# Patient Record
Sex: MALE | Race: WHITE | URBAN - METROPOLITAN AREA
[De-identification: names, ages, dates, MRNs, and addresses within clinical notes are randomized per-mention and may not be internally consistent; named-entity substitution may affect disease eponyms.]

---

## 2017-01-02 ENCOUNTER — TRANSFERRED RECORDS (OUTPATIENT)
Dept: HEALTH INFORMATION MANAGEMENT | Facility: CLINIC | Age: 67
End: 2017-01-02

## 2017-01-04 ENCOUNTER — TRANSFERRED RECORDS (OUTPATIENT)
Dept: HEALTH INFORMATION MANAGEMENT | Facility: CLINIC | Age: 67
End: 2017-01-04

## 2017-01-09 ENCOUNTER — OFFICE VISIT (OUTPATIENT)
Dept: UROLOGY | Facility: CLINIC | Age: 67
End: 2017-01-09

## 2017-01-09 VITALS
WEIGHT: 280 LBS | HEIGHT: 72 IN | HEART RATE: 108 BPM | SYSTOLIC BLOOD PRESSURE: 131 MMHG | BODY MASS INDEX: 37.93 KG/M2 | DIASTOLIC BLOOD PRESSURE: 94 MMHG

## 2017-01-09 DIAGNOSIS — C68.0 MALIGNANT NEOPLASM OF URETHRA (H): Primary | ICD-10-CM

## 2017-01-09 DIAGNOSIS — C68.0 URETHRAL CANCER (H): Primary | ICD-10-CM

## 2017-01-09 ASSESSMENT — ENCOUNTER SYMPTOMS
NIGHT SWEATS: 0
MYALGIAS: 1
JOINT SWELLING: 0
ALTERED TEMPERATURE REGULATION: 0
DYSURIA: 0
POLYDIPSIA: 0
DECREASED APPETITE: 0
HEMATURIA: 0
WEIGHT GAIN: 0
FEVER: 0
CHILLS: 0
BACK PAIN: 0
HALLUCINATIONS: 0
INCREASED ENERGY: 0
MUSCLE CRAMPS: 1
ARTHRALGIAS: 0
STIFFNESS: 0
NECK PAIN: 0
MUSCLE WEAKNESS: 0
FATIGUE: 1
FLANK PAIN: 0
DIFFICULTY URINATING: 0
POLYPHAGIA: 0
WEIGHT LOSS: 0

## 2017-01-09 ASSESSMENT — PAIN SCALES - GENERAL: PAINLEVEL: NO PAIN (1)

## 2017-01-09 NOTE — PROGRESS NOTES
Chief Complaint:    High Grade Invasive Urethral Cancer           Consult or Referral:     Mr. Micah Lopez is a 66 year old male seen in consultation from Dr. Dorsey.         History of Present Illness:    Micah Lopez is a very pleasant 66 year old male who presents with a history of urethral cancer.  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  This was all recently diagnosed after noting some urinary frequency, burning with urination and and persistent symptoms after being treated for several months on antibioitics.  He eventually underwent a cystoscopy and was noted to have some necrotic material in the bulbar urethra.  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 Urothelial cancer with squamous differentiation.  He was counseled that he would likely need to have a cystoprostatectomy, penectomy and urethrectomy and possible need for complex plastics reconstruction, with a muscle flap.  He was referred for discussion of this.    He also noted that he experiences pain when he sits, though it is somewhat better after placement of the SPT.  He is a solo practicing  and will have to make arrangements for being away.    Also interested to know the utility of radiation or chemotherapy as neoajuvant or adjuvant treatments.        Date of Initial Diagnosis: 2016  Stage of Initial Diagnosis: [id number]  Grade at Initial Diagnosis: high  Site of First Diagnosis: bulbar urethra  Any Recurrence? n/a  Date of Last Cysto: 16  Date of Last Upper Tract Imagin2017    Bone scan, CT and MRI all suggest no distant mets.      Remote history of one urethral dilation.  No history of HPV, urethritis.  Maybe a history of prostatitis in the past.  Remote smoking history.           Past Medical History:   History reviewed. No pertinent past medical  history.  CHF  DM  hypercholesterolemia  Keratosis  Sleep apnea                   Past Surgical History:   History reviewed. No pertinent past surgical history.  Urethral Dilation  Cysto and SPT placement             Medications     No current outpatient prescriptions on file.     No current facility-administered medications for this visit.            Family History:   History reviewed. No pertinent family history.         Social History:     Social History     Social History     Marital Status:      Spouse Name: N/A     Number of Children: N/A     Years of Education: N/A     Occupational History     Not on file.     Social History Main Topics     Smoking status: Former Smoker     Smokeless tobacco: Never Used     Alcohol Use: Not on file     Drug Use: Not on file     Sexual Activity: Not on file     Other Topics Concern     Not on file     Social History Narrative     No narrative on file            Allergies:   Review of patient's allergies indicates no known allergies.         Review of Systems:  From intake questionnaire     Answers for HPI/ROS submitted by the patient on 1/9/2017   General Symptoms: Yes  Skin Symptoms: No  HENT Symptoms: No  EYE SYMPTOMS: No  HEART SYMPTOMS: No  LUNG SYMPTOMS: No  INTESTINAL SYMPTOMS: No  URINARY SYMPTOMS: Yes  REPRODUCTIVE SYMPTOMS: Yes  SKELETAL SYMPTOMS: Yes  BLOOD SYMPTOMS: No  NERVOUS SYSTEM SYMPTOMS: No  MENTAL HEALTH SYMPTOMS: No  Fever: No  Loss of appetite: No  Weight loss: No  Weight gain: No  Fatigue: Yes  Night sweats: No  Chills: No  Increased stress: No  Excessive hunger: No  Excessive thirst: No  Feeling hot or cold when others believe the temperature is normal: No  Loss of height: No  Post-operative complications: No  Surgical site pain: No  Hallucinations: No  Change in or Loss of Energy: No  Hyperactivity: No  Confusion: No  Trouble holding urine or incontinence: No  Pain or burning: No  Trouble starting or stopping: No  Increased frequency of  urination: No  Blood in urine: No  Decreased frequency of urination: No  Frequent nighttime urination: No  Flank pain: No  Difficulty emptying bladder: No  Back pain: No  Muscle aches: Yes  Neck pain: No  Swollen joints: No  Joint pain: No  Bone pain: No  Muscle cramps: Yes  Muscle weakness: No  Joint stiffness: No  Bone fracture: No  Scrotal pain or swelling: No  Erectile dysfunction: Yes  Penile discharge: Yes  Genital ulcers: No  Reduced libido: Yes             Physical Exam:     Patient is a 66 year old  male   Vitals: Blood pressure 131/94, pulse 108, height 1.829 m (6'), weight 127.007 kg (280 lb).  General Appearance Adult: Alert, no acute distress, oriented  HENT: throat/mouth:normal, good dentition  Lungs: no respiratory distress, or pursed lip breathing  Heart: No obvious jugular venous distension present  Abdomen: soft, nontender, no organomegaly or masses, Body mass index is 37.97 kg/(m^2)., scars noted None  Lymphatics: No cervical or supraclavicular adenopathy  Musculoskeltal: extremities normal, no peripheral edema  Skin: no suspicious lesions or rashes  Neuro: Alert, oriented, speech and mentation normal  Psych: affect and mood expectably down, but resolved to move forward  Gait: Normal        Labs and Pathology:    I reviewed all applicable laboratory and pathology data and went over findings with patient  Significant for No results found for this basename: cr    Invasive urethral cancer diagnosed on 12/21/2017        Imaging:    I reviewed all applicable imaging and went over findings with patient.  Significant for MRI, CT cap, bone scan negative      Outside and Past Medical records:    I spent 10 minutes reviewing outside and past medical records.             Assessment and Plan:     Assessment:   T3 urethral Cancer by imaging   Metastatic workup is completed.  If completed it does show sign of distant disease.    Plan:    We had a long discussion about invasive urethral cancer.  It is a rare  cancer and that makes predicting survival outcomes particularly challenging for his stage 3 urethral cancer (due to invasion of both corpora cavernosa).  Survival is poor without treatment with more than 50% dying within 1-2 years per the SEER medicare results RENE Griffin. (2011). Prognostic factors in male urethral cancer. Cancer, 117(11), 9681-5244. Https://doi.org/10.1002/cncr.02148    With radical surgical excision, there is a better survival rate with about 50-60% still alive with 10 years of follow up.    We discussed radical cystectomy and the potential complications associated with it, including a 2-3% perioperative mortality risk, and the risk of complications is about 60%.  We also discussed about 20-30% of patients need to go to a rehab facility after surgery and about 25% return to the hospital for complications.      We also briefly discussed the use of radiation combined with chemotherapy, and though this type of treatment is used, it is less effective and generally reserved for those who are not surgical candidates or those who refuse surgery all together.  However I would like to discuss in  oncology conf.  We also discussed the various types of urinary diversion including ileal conduit, [place] Pouch and ileal neobladder.  Neobladders are not used when the penis and urethra have to be removed in their entirety.  The various pros and cons of each type of urinary diversion was discussed and all questions were answered.  Prospective quality of life data is lacking that compares the various diversion types, but retrospective data suggest the differences are small by diversion type.    We discussed the use of neoadjuvant chemotherapy in the setting of muscle invasive bladder cancer and that the combine analyses of these studies indicate approximately a 5% absolute survival advantage at 5 years. We don't have any solid evidence of the utility of chemotherapy prior to urethral cancer resection, but it is  likely not very helpful in most cases, particularly with squamous cell cancer.  We also discussed the role of adjuvant chemotherapy.    The patient and his family had many questions and we went over these carefully.  We provided the patient with a bladder cancer binder and indicated the resources available inside.    The patient has decided to proceed with a radical cystoprostatectomy, penectomy, urethrectomy and lymph node dissection.  he would like to think about  Which type of urinary diversion.  Will get this scheduled in the near future.    Will need to see PAC, get US for DVT, see stoma therapy nurses, meet with plastics and we need to coordinate surgery schedules.    In the mean time, I encouraged physical fitness and activity.    We shared the bladder cancer binder and made him aware of the patient support group at the [facility].    Will try and connect with other urethral cancer patients so he can converse with them.      Orders  Orders Placed This Encounter   Procedures     Tawnya-Operative Worksheet     US Lower Extremity Venous Duplex Bilateral     WOC Nursing Referral     Nutrition Referral       F/U:   For surgery    I spent over 60 minutes with patient with more than 50% face to face discussion and coordination of care.        Ricky Brannon MD  Department of Urology Staff  [facility]    CC  Patient Care Team:  Vicente Jimenez MD as PCP - General (Internal Medicine)  Ricky Brannon MD as MD (Urology)  Carolina Fox as Referring Physician (Pediatrics)  CAROLINA FOX    Copy to patient  SUSANNA WAKEFIELD  [address] [place] [address]

## 2017-01-09 NOTE — NURSING NOTE
Chief Complaint   Patient presents with     Consult     New patient consult for urethral cancer       Initial Ht 1.829 m (6')  Wt 127.007 kg (280 lb)  BMI 37.97 kg/m2 Estimated body mass index is 37.97 kg/(m^2) as calculated from the following:    Height as of this encounter: 1.829 m (6').    Weight as of this encounter: 127.007 kg (280 lb).  BP completed using cuff size: AYAN HernandezA

## 2017-01-09 NOTE — PATIENT INSTRUCTIONS
Schedule surgery with Dr. Brannon.    It was a pleasure meeting with you today.  Thank you for allowing me and my team the privilege of caring for you today.  YOU are the reason we are here, and I truly hope we provided you with the excellent service you deserve.  Please let us know if there is anything else we can do for you so that we can be sure you are leaving completely satisfied with your care experience.      ARMINDA Douglas

## 2017-01-09 NOTE — MR AVS SNAPSHOT
After Visit Summary   1/9/2017    Micah Lopez    MRN: [id number]           Patient Information     Date Of Birth          1950        Visit Information        Provider Department      1/9/2017 7:30 AM Ricky Brannon MD [facility] Urology and [facility] for Prostate and Urologic Cancers        Today's Diagnoses     Malignant neoplasm of urethra (H)    -  1       Care Instructions    Schedule surgery with Dr. Brannon.    It was a pleasure meeting with you today.  Thank you for allowing me and my team the privilege of caring for you today.  YOU are the reason we are here, and I truly hope we provided you with the excellent service you deserve.  Please let us know if there is anything else we can do for you so that we can be sure you are leaving completely satisfied with your care experience.      ARMINDA Douglas        Follow-ups after your visit        Who to contact     Please call your clinic at [phone] to:    Ask questions about your health    Make or cancel appointments    Discuss your medicines    Learn about your test results    Speak to your doctor   If you have compliments or concerns about an experience at your clinic, or if you wish to file a complaint, please contact [facility] Patient Relations at [phone] or email us at [email or web address]         Additional Information About Your Visit        MyChart Information     [employer] is an electronic gateway that provides easy, online access to your medical records. With [employer], you can request a clinic appointment, read your test results, renew a prescription or communicate with your care team.     To sign up for [employer] visit the website at [email or web address]   You will be asked to enter the access code listed below, as well as some personal information. Please follow the directions to create your username and password.     Your access code is: [email or web address]  Expires:  4/3/2017  9:40 AM     Your access code will  in 90 days. If you need help or a new code, please contact your [facility] or call [phone] for assistance.        Care EveryWhere ID     This is your Care EveryWhere ID. This could be used by other organizations to access your [place] medical records  [id number]        Your Vitals Were     Pulse Height BMI (Body Mass Index)             108 1.829 m (6') 37.97 kg/m2          Blood Pressure from Last 3 Encounters:   17 131/94    Weight from Last 3 Encounters:   17 127.007 kg (280 lb)              We Performed the Following     Tawnya-Operative Worksheet        Primary Care Provider Office Phone # Fax #    Vicente Jimenez [phone]       [facility] MEDICAL [address]  [facility] [address]        Thank you!     Thank you for choosing [facility] UROLOGY AND [facility] FOR PROSTATE AND UROLOGIC CANCERS  for your care. Our goal is always to provide you with excellent care. Hearing back from our patients is one way we can continue to improve our services. Please take a few minutes to complete the written survey that you may receive in the mail after your visit with us. Thank you!             Your Updated Medication List - Protect others around you: Learn how to safely use, store and throw away your medicines at www.disposemymeds.org.      Notice  As of 2017  8:59 AM    You have not been prescribed any medications.

## 2017-01-09 NOTE — Clinical Note
1/9/2017       RE: Micah Lopez  [address]  [place] [address]     Dear Colleague,    Thank you for referring your patient, Micah Lopez, to the [facility] UROLOGY AND [facility] FOR PROSTATE AND UROLOGIC CANCERS at [facility]. Please see a copy of my visit note below.          Chief Complaint:    High Grade Invasive Urethral Cancer           Consult or Referral:     Mr. Micah Lopez is a 66 year old male seen in consultation from Dr. Dorsey.         History of Present Illness:    Micah Lopez is a very pleasant 66 year old male who presents with a history of urethral cancer.  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  This was all recently diagnosed after noting some urinary frequency, burning with urination and and persistent symptoms after being treated for several months on antibioitics.  He eventually underwent a cystoscopy and was noted to have some necrotic material in the bulbar urethra.  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 Urothelial cancer with squamous differentiation.  He was counseled that he would likely need to have a cystoprostatectomy, penectomy and urethrectomy and possible need for complex plastics reconstruction, with a muscle flap.  He was referred for discussion of this.    He also noted that he experiences pain when he sits, though it is somewhat better after placement of the SPT.  He is a solo practicing  and will have to make arrangements for being away.    Also interested to know the utility of radiation or chemotherapy as neoajuvant or adjuvant treatments.        Date of Initial Diagnosis: 12/21/2016  Stage of Initial Diagnosis: [id number]  Grade at Initial Diagnosis: high  Site of First Diagnosis: bulbar urethra  Any Recurrence? n/a  Date of Last Cysto: 12/21/16  Date of Last Upper Tract Imaging:  1/4/2017    Bone scan, CT and MRI all suggest no distant mets.      Remote history of one urethral dilation.  No history of HPV, urethritis.  Maybe a history of prostatitis in the past.  Remote smoking history.           Past Medical History:   History reviewed. No pertinent past medical history.  CHF  DM  hypercholesterolemia  Keratosis  Sleep apnea                   Past Surgical History:   History reviewed. No pertinent past surgical history.  Urethral Dilation  Cysto and SPT placement             Medications     No current outpatient prescriptions on file.     No current facility-administered medications for this visit.            Family History:   History reviewed. No pertinent family history.         Social History:     Social History     Social History     Marital Status:      Spouse Name: N/A     Number of Children: N/A     Years of Education: N/A     Occupational History     Not on file.     Social History Main Topics     Smoking status: Former Smoker     Smokeless tobacco: Never Used     Alcohol Use: Not on file     Drug Use: Not on file     Sexual Activity: Not on file     Other Topics Concern     Not on file     Social History Narrative     No narrative on file            Allergies:   Review of patient's allergies indicates no known allergies.         Review of Systems:  From intake questionnaire     Answers for HPI/ROS submitted by the patient on 1/9/2017   General Symptoms: Yes  Skin Symptoms: No  HENT Symptoms: No  EYE SYMPTOMS: No  HEART SYMPTOMS: No  LUNG SYMPTOMS: No  INTESTINAL SYMPTOMS: No  URINARY SYMPTOMS: Yes  REPRODUCTIVE SYMPTOMS: Yes  SKELETAL SYMPTOMS: Yes  BLOOD SYMPTOMS: No  NERVOUS SYSTEM SYMPTOMS: No  MENTAL HEALTH SYMPTOMS: No  Fever: No  Loss of appetite: No  Weight loss: No  Weight gain: No  Fatigue: Yes  Night sweats: No  Chills: No  Increased stress: No  Excessive hunger: No  Excessive thirst: No  Feeling hot or cold when others believe the temperature is normal: No  Loss  of height: No  Post-operative complications: No  Surgical site pain: No  Hallucinations: No  Change in or Loss of Energy: No  Hyperactivity: No  Confusion: No  Trouble holding urine or incontinence: No  Pain or burning: No  Trouble starting or stopping: No  Increased frequency of urination: No  Blood in urine: No  Decreased frequency of urination: No  Frequent nighttime urination: No  Flank pain: No  Difficulty emptying bladder: No  Back pain: No  Muscle aches: Yes  Neck pain: No  Swollen joints: No  Joint pain: No  Bone pain: No  Muscle cramps: Yes  Muscle weakness: No  Joint stiffness: No  Bone fracture: No  Scrotal pain or swelling: No  Erectile dysfunction: Yes  Penile discharge: Yes  Genital ulcers: No  Reduced libido: Yes             Physical Exam:     Patient is a 66 year old  male   Vitals: Blood pressure 131/94, pulse 108, height 1.829 m (6'), weight 127.007 kg (280 lb).  General Appearance Adult: Alert, no acute distress, oriented  HENT: throat/mouth:normal, good dentition  Lungs: no respiratory distress, or pursed lip breathing  Heart: No obvious jugular venous distension present  Abdomen: soft, nontender, no organomegaly or masses, Body mass index is 37.97 kg/(m^2)., scars noted None  Lymphatics: No cervical or supraclavicular adenopathy  Musculoskeltal: extremities normal, no peripheral edema  Skin: no suspicious lesions or rashes  Neuro: Alert, oriented, speech and mentation normal  Psych: affect and mood expectably down, but resolved to move forward  Gait: Normal        Labs and Pathology:    I reviewed all applicable laboratory and pathology data and went over findings with patient  Significant for No results found for this basename: cr    Invasive urethral cancer diagnosed on 12/21/2017        Imaging:    I reviewed all applicable imaging and went over findings with patient.  Significant for MRI, CT cap, bone scan negative      Outside and Past Medical records:    I spent 10 minutes reviewing  outside and past medical records.             Assessment and Plan:     Assessment:   T3 urethral Cancer by imaging   Metastatic workup is completed.  If completed it does show sign of distant disease.    Plan:    We had a long discussion about invasive urethral cancer.  It is a rare cancer and that makes predicting survival outcomes particularly challenging for his stage 3 urethral cancer (due to invasion of both corpora cavernosa).  Survival is poor without treatment with more than 50% dying within 1-2 years per the SEER medicare results LEN Griffin (2011). Prognostic factors in male urethral cancer. Cancer, 117(11), 8361-1369. Https://doi.org/10.1002/cncr.03167    With radical surgical excision, there is a better survival rate with about 50-60% still alive with 10 years of follow up.    We discussed radical cystectomy and the potential complications associated with it, including a 2-3% perioperative mortality risk, and the risk of complications is about 60%.  We also discussed about 20-30% of patients need to go to a rehab facility after surgery and about 25% return to the hospital for complications.      We also briefly discussed the use of radiation combined with chemotherapy, and though this type of treatment is used, it is less effective and generally reserved for those who are not surgical candidates or those who refuse surgery all together.  However I would like to discuss in  oncology conf.  We also discussed the various types of urinary diversion including ileal conduit, [place] Pouch and ileal neobladder.  Neobladders are not used when the penis and urethra have to be removed in their entirety.  The various pros and cons of each type of urinary diversion was discussed and all questions were answered.  Prospective quality of life data is lacking that compares the various diversion types, but retrospective data suggest the differences are small by diversion type.    We discussed the use of neoadjuvant  chemotherapy in the setting of muscle invasive bladder cancer and that the combine analyses of these studies indicate approximately a 5% absolute survival advantage at 5 years. We don't have any solid evidence of the utility of chemotherapy prior to urethral cancer resection, but it is likely not very helpful in most cases, particularly with squamous cell cancer.  We also discussed the role of adjuvant chemotherapy.    The patient and his family had many questions and we went over these carefully.  We provided the patient with a bladder cancer binder and indicated the resources available inside.    The patient has decided to proceed with a radical cystoprostatectomy, penectomy, urethrectomy and lymph node dissection.  he would like to think about  Which type of urinary diversion.  Will get this scheduled in the near future.    Will need to see PAC, get US for DVT, see stoma therapy nurses, meet with plastics and we need to coordinate surgery schedules.    In the mean time, I encouraged physical fitness and activity.    We shared the bladder cancer binder and made him aware of the patient support group at the [facility].    Will try and connect with other urethral cancer patients so he can converse with them.      Orders  Orders Placed This Encounter   Procedures     Tawnya-Operative Worksheet     US Lower Extremity Venous Duplex Bilateral     WOC Nursing Referral     Nutrition Referral       F/U:   For surgery    I spent over 60 minutes with patient with more than 50% face to face discussion and coordination of care.        Ricky Brannon MD  Department of Urology Staff  [facility]    CC  Patient Care Team:  Vicente Jimenez MD as PCP - General (Internal Medicine)  Rciky Brannon MD as MD (Urology)  Carolina Fox as Referring Physician (Pediatrics)  CAROLINA FOX    Copy to patient  SUSANNA WAKEFIELD  [address] [place] [address]

## 2017-01-09 NOTE — NURSING NOTE
Met with patient today in regards to Bladder cancer.  Introduced myself as patient's  Care Coordinator and  for Dr. Brannon.  Bladder cancer binder given to patient and contents gone over.  All questions and concerns were answered.  Business card with my information given to patient.   Encouraged to call back with any questions or concerns.  Patient states understanding.    Ginna Cosby RN         Pre Op Teaching Flowsheet       Pre and Post op Patient Education  Relevant Diagnosis:  Invasive urethral cancer  Surgical procedure:  Radical Cystoprostatectomy, penectomy and urethrectomy and ileal conduit or [place] pouch, lymph node dissection and complex wound closure  Teaching Topic:  Pre and post op teaching  Person Involved in teaching: Micah Lopez    Motivation Level:  Asks Questions: Yes  Eager to Learn:  Yes  Cooperative: Yes  Receptive (willing/able to accept information):  Yes    Patient demonstrates understanding of the following:  Date of surgery:  TBD  Location of surgery:  [address] [place] [address]  History and Physical and any other testing necessary prior to surgery: Yes  Required time line for completion of History and Physical and any pre-op testing: Yes    Patient demonstrates understanding of the following:  Pre-op bowel prep:  Clear liquids day prior with Fleets enema that night  Pre-op showering/scrub information with PCMX Soap: Yes  Blood thinner medications discussed and when to stop (if applicable):  Yes      Infection Prevention:   Patient demonstrates understanding of the following:  Surgical procedure site care taught: at time of discharge  Signs and symptoms of infection taught:  Yes      Post-op follow-up:  Discussed how to contact the hospital, nurse, and clinic scheduling staff if necessary.    Instructional materials used/given/mailed:  [facility] Booklet, post op teaching sheet, Map, Soap, and arrival/location information.    Surgical instructions packet given to  patient in office:  Yes.

## 2017-01-18 ENCOUNTER — OFFICE VISIT (OUTPATIENT)
Dept: SURGERY | Facility: CLINIC | Age: 67
End: 2017-01-18

## 2017-01-18 ENCOUNTER — OFFICE VISIT (OUTPATIENT)
Dept: WOUND CARE | Facility: CLINIC | Age: 67
End: 2017-01-18

## 2017-01-18 ENCOUNTER — ALLIED HEALTH/NURSE VISIT (OUTPATIENT)
Dept: SURGERY | Facility: CLINIC | Age: 67
End: 2017-01-18

## 2017-01-18 ENCOUNTER — OFFICE VISIT (OUTPATIENT)
Dept: PLASTIC SURGERY | Facility: CLINIC | Age: 67
End: 2017-01-18

## 2017-01-18 ENCOUNTER — ANESTHESIA EVENT (OUTPATIENT)
Dept: SURGERY | Facility: CLINIC | Age: 67
End: 2017-01-18

## 2017-01-18 VITALS
WEIGHT: 280 LBS | HEIGHT: 72 IN | DIASTOLIC BLOOD PRESSURE: 79 MMHG | TEMPERATURE: 97.9 F | OXYGEN SATURATION: 95 % | RESPIRATION RATE: 16 BRPM | SYSTOLIC BLOOD PRESSURE: 119 MMHG | BODY MASS INDEX: 37.93 KG/M2 | HEART RATE: 87 BPM

## 2017-01-18 DIAGNOSIS — Z01.818 PRE-OP EXAM: Primary | ICD-10-CM

## 2017-01-18 DIAGNOSIS — C68.0 URETHRAL CANCER (H): ICD-10-CM

## 2017-01-18 DIAGNOSIS — Z71.89 OSTOMY NURSE CONSULTATION: Primary | ICD-10-CM

## 2017-01-18 DIAGNOSIS — G47.30 SLEEP APNEA, UNSPECIFIED TYPE: ICD-10-CM

## 2017-01-18 DIAGNOSIS — C68.0 URETHRAL CANCER (H): Primary | ICD-10-CM

## 2017-01-18 DIAGNOSIS — Z71.89 ENCOUNTER FOR COUNSELING FOR UROSTOMY MANAGEMENT: ICD-10-CM

## 2017-01-18 DIAGNOSIS — E11.8 TYPE 2 DIABETES MELLITUS WITH COMPLICATION, WITHOUT LONG-TERM CURRENT USE OF INSULIN (H): ICD-10-CM

## 2017-01-18 LAB
ABO + RH BLD: NORMAL
ABO + RH BLD: NORMAL
ALBUMIN UR-MCNC: 30 MG/DL
APPEARANCE UR: ABNORMAL
BACTERIA #/AREA URNS HPF: ABNORMAL /HPF
BILIRUB UR QL STRIP: NEGATIVE
BLD GP AB SCN SERPL QL: NORMAL
BLOOD BANK CMNT PATIENT-IMP: NORMAL
BLOOD BANK CMNT PATIENT-IMP: NORMAL
COLOR UR AUTO: YELLOW
GLUCOSE UR STRIP-MCNC: 50 MG/DL
HBA1C MFR BLD: 8.6 % (ref 4.3–6)
HGB UR QL STRIP: ABNORMAL
HYALINE CASTS #/AREA URNS LPF: 8 /LPF (ref 0–2)
KETONES UR STRIP-MCNC: 5 MG/DL
LEUKOCYTE ESTERASE UR QL STRIP: ABNORMAL
MUCOUS THREADS #/AREA URNS LPF: PRESENT /LPF
NITRATE UR QL: POSITIVE
PH UR STRIP: 5 PH (ref 5–7)
RBC #/AREA URNS AUTO: 50 /HPF (ref 0–2)
SP GR UR STRIP: 1.02 (ref 1–1.03)
SPECIMEN EXP DATE BLD: NORMAL
SQUAMOUS #/AREA URNS AUTO: 1 /HPF (ref 0–1)
URN SPEC COLLECT METH UR: ABNORMAL
UROBILINOGEN UR STRIP-MCNC: 0 MG/DL (ref 0–2)
WBC #/AREA URNS AUTO: 128 /HPF (ref 0–2)

## 2017-01-18 RX ORDER — ACETAMINOPHEN 500 MG
500 TABLET ORAL PRN
COMMUNITY

## 2017-01-18 RX ORDER — CEFAZOLIN SODIUM 1 G/50ML
3 SOLUTION INTRAVENOUS
Status: CANCELLED | OUTPATIENT
Start: 2017-01-18

## 2017-01-18 RX ORDER — LISINOPRIL 10 MG/1
10 TABLET ORAL EVERY MORNING
COMMUNITY
Start: 2016-10-10

## 2017-01-18 RX ORDER — CITALOPRAM HYDROBROMIDE 20 MG/1
20 TABLET ORAL AT BEDTIME
COMMUNITY
Start: 2017-01-13

## 2017-01-18 RX ORDER — ATORVASTATIN CALCIUM 80 MG/1
80 TABLET, FILM COATED ORAL EVERY MORNING
COMMUNITY
Start: 2016-10-10

## 2017-01-18 RX ORDER — GLIPIZIDE AND METFORMIN HCL 2.5; 5 MG/1; MG/1
1 TABLET, FILM COATED ORAL
COMMUNITY

## 2017-01-18 RX ORDER — CYCLOBENZAPRINE HCL 10 MG
10 TABLET ORAL AT BEDTIME
COMMUNITY
Start: 2017-01-06

## 2017-01-18 NOTE — PATIENT INSTRUCTIONS
Preparing for Your Surgery      Name:  Micah Lopez   MRN:  [id number]   :  1950   Today's Date:  2017     Arriving for surgery:  Surgery date:  Will call with information  Surgery time:  Will call with information  Arrival time:  Will call with information  Please come to:     Will call with information    What can I eat or drink?  -  You may have solid food or milk products until 8 hours prior to your surgery.  Bowel prep instructions were not available at the time of PAC visit - will call to verify that patient understands bowel prep instructions when surgery is scheduled.  -  You may have water, apple juice or 7up/Sprite until 2 hours prior to your surgery.    Which medicines can I take?  -  Do NOT take these medications in the morning, the day of surgery:  Aspirin, ibuprofen, lisinopril, metformin    -  Please take these medications the day of surgery:  Tylenol,celexa if normally taken in the morning      How do I prepare myself?  -  Take two showers: one the night before surgery; and one the morning of surgery.         Use Scrubcare or Hibiclens to wash from neck down.  You may use your own shampoo and conditioner. No other hair products.   -  Do NOT use lotion, powder, deodorant, or antiperspirant the day of your surgery.  -  Do NOT wear any makeup, fingernail polish or jewelry.  -  Begin using Incentive Spirometer 1 week prior to surgery.  Use 4 times per day, up to 5-10 breaths each time.  Bring Incentive Spirometer to hospital.  -Do not bring your own medications to the hospital, except for inhalers and eye drops.  -  Bring your ID and insurance card.    Questions or Concerns:  If you have questions or concerns, please call the  Preoperative Assessment Center, Monday-Friday 7AM-7PM:  [phone]          Enhanced Recovery After Surgery     This is a team effort, including you, to get you back on your feet, eating and drinking normally and out of the hospital as quickly as possible.  The  goals are: 1) NO INFECTIONS and   2) RETURN TO NORMAL DIET    How can we achieve these goals?  1) STAY ACTIVE: Walk every day before your surgery; try to increase the amount every day.  Walk after surgery as much as you can-the nurses will help you.  Walking speeds healing and gets you home quicker, you heal better at home and have less risk of infection.     2) INCENTIVE SPIROMETER: Practice your incentive spirometer 4 times per day with 5-10 repetitions each time.  Using the incentive spirometer can strengthen your muscles between your ribs and help you have a strong cough after surgery.  A more effective cough can help prevent problems with your lungs.    3) STAY HYDRATED: Drink clear liquids up until 2 hours before your surgery. We would like you to purchase a drink such as Gatorade.  Drink this before bedtime and on the way into the hospital, drink between 8-12 ounces or until you feel hydrated.  Keeping well hydrated leads to your veins being plump, you wake up faster, and you are less likely to be nauseated. Start drinking water as soon as you can after surgery and advance to clear liquids and food as tolerated.  IV fluids contain salt, drinking fluids will minimize the amount of IV fluids you need and decrease the amount of salt you get.     4) PAIN MANAGEMENT: If we minimize the amount of opioids and narcotics, and use regional blocks (which numb the area where your surgery is) along with oral pain medications; you will have less side effects of nausea and constipation. Narcotics can slow down your bowels and cause you to stay in the hospital longer.     Our goal is to keep you comfortable; eating and drinking normally and back home safely.

## 2017-01-18 NOTE — H&P
Pre-Operative H & P     CC:  Preoperative exam to assess for increased cardiopulmonary risk while undergoing surgery and anesthesia.    Date of Encounter: 1/18/2017  Primary Care Physician:  Vicente Jimenez    HPI  Micah Lopez is a 66 year old male who presents for pre-operative H & P in preparation for radical cystoprostatectomy, penectomy and urethrectomy (procedure not listed) he has high grade urethral cancer with Dr. Brannon on TBD date at [facility].   He was recently diagnosed after becoming symptomatic with urinary frequency and dysuria that didn't subside after months of anti-microbial treatment.      He currently has a suprapubic catheter and denies dysuria, flank pain and fever/chills.  He reports some mild drainage clear-yellowish drainage around suprapubic site however was seen by a provider it was not thought to be infectious.    He has a history of DM Type 2, HTN, HLD, obesity and sleep apnea.    He denies any current symptoms of cough, congestion, sore throat of fever/chills.     History is obtained from the patient.     Past Medical History  Past Medical History   Diagnosis Date     Diabetes type 2, controlled (H)      Obesity      Sleep apnea      CPAP     Hyperlipidemia      Hypertension        Past Surgical History  Past Surgical History   Procedure Laterality Date     Urethral dilation       Recent urological procedure  12/2016       Hx of Blood transfusions/reactions: Denies     Hx of abnormal bleeding or anti-platelet use: Denies    Menstrual history: No LMP for male patient.:     Steroid use in the last year: Denies    Personal or FH with difficulty with Anesthesia:  Denies    Prior to Admission Medications  Current Outpatient Prescriptions   Medication Sig Dispense Refill     acetaminophen (TYLENOL) 500 MG tablet Take 500 mg by mouth as needed for mild pain (NO more than twice daily)       Multiple Vitamins-Minerals (MULTIVITAMIN ADULT PO)  Take 1 tablet by mouth every morning       glipiZIDE-metFORMIN (METAGLIP) 2.5-500 MG per tablet Take 1 tablet by mouth 2 times daily (before meals)       aspirin 81 MG tablet Take 81 mg by mouth daily        atorvastatin (LIPITOR) 80 MG tablet Take 80 mg by mouth every morning        citalopram (CELEXA) 20 MG tablet Take 20 mg by mouth At Bedtime        cyclobenzaprine (FLEXERIL) 10 MG tablet Take 10 mg by mouth At Bedtime        lisinopril (PRINIVIL/ZESTRIL) 10 MG tablet Take 10 mg by mouth every morning          Allergies  No Known Allergies    Social History  Social History     Social History     Marital Status:      Spouse Name: N/A     Number of Children: N/A     Years of Education: N/A     Occupational History           Social History Main Topics     Smoking status: Former Smoker     Smokeless tobacco: Never Used      Comment: smoked briefly for 4-5 years     Alcohol Use: No     Drug Use: No     Sexual Activity: Not on file     Other Topics Concern     Not on file     Social History Narrative       Family History  Family History   Problem Relation Age of Onset     CANCER Mother      CEREBROVASCULAR DISEASE Father        Review of Systems  Functional status: Independent in ADL's.   METS > 4 .      The complete review of systems is negative other than noted in the HPI or here.     C: NEGATIVE for fever, chills, change in weight  INTEGUMENTARY/SKIN: NEGATIVE for worrisome rashes, moles or lesions  E/M: NEGATIVE for ear, mouth and throat problems  R: NEGATIVE for significant cough or SOB  CV: NEGATIVE for chest pain, palpitations or peripheral edema  GI: NEGATIVE for nausea, abdominal pain, heartburn, or change in bowel habits  : see HPI  MUSCULOSKELETAL: NEGATIVE for significant arthralgias or myalgia  NEURO: NEGATIVE for weakness, dizziness or paresthesias  ENDOCRINE: NEGATIVE for temperature intolerance, skin/hair changes  HEME/ALLERGY/IMMUNE: NEGATIVE for bleeding problems  PSYCHIATRIC: NEGATIVE  "for changes in mood or affect      Temp: 97.9  F (36.6  C) Temp src: Oral BP: 119/79 mmHg Pulse: 87   Resp: 16 SpO2: 95 %         280 lbs 0 oz  6' 0\"   Body mass index is 37.97 kg/(m^2).       Physical Exam  Constitutional: Awake, alert, cooperative, no apparent distress, and appears stated age.  Eyes: Pupils equal, round and reactive to light, extra ocular muscles intact, sclera clear, conjunctiva normal.  HENT: Normocephalic, oral pharynx with moist mucus membranes, good dentition. No goiter appreciated.   Respiratory: Clear to auscultation bilaterally, no crackles or wheezing.  Cardiovascular: Regular rate and rhythm, normal S1 and S2, and no murmur noted.  Carotids +2, no bruits. No edema. Palpable pulses to radial  DP and PT arteries.   GI: Normal bowel sounds, soft, non-distended, non-tender, difficult to assess masses and hepatosplenomegaly. Suprapubic catheter intact mild erythema surrounding site, no drainage noted.   Lymph/Hematologic: No cervical lymphadenopathy and no supraclavicular lymphadenopathy.  Genitourinary:  N/A  Skin: Warm and dry.  No rashes at anticipated surgical site.   Musculoskeletal: Full ROM of neck. There is no redness, warmth, or swelling of the joints. Gross motor strength is normal.    Neurologic: Awake, alert, oriented to name, place and time. Cranial nerves II-XII are grossly intact. Gait is normal.   Neuropsychiatric: Calm, cooperative. Normal affect.     Labs: (personally reviewed)  1/2/17       LIVER FUNCTION PROFILE (01/02/2017 4:27 PM)  LIVER FUNCTION PROFILE (01/02/2017 4:27 PM)   Component Value Ref Range   TOTAL PROTEIN 7.2 6.0 - 8.0 GM/DL   ALBUMIN 4.1 3.4 - 4.8 GM/DL   GLOBULIN 3.1 2.0 - 3.5 GM/DL   A/G RATIO 1.3 1.0 - 2.0   SGOT (AST) 18 5 - 41 U/L   SGPT (ALT) 29 8 - 45 U/L   ALK P'TASE 105 36 - 150 U/L   TOTAL BILI 0.4 0.2 - 1.2 MG/DL   DIR. BILI 0.2 0.0 - 0.5 MG/DL   IND. BILI 0.2 0.0 - 0.7 MG/DL     LIVER FUNCTION PROFILE (01/02/2017 4:27 PM)   Specimen Performing " Laboratory     [facility]    [address] [facility] [place] [address]     Back to top of Lab Results      CBC WITH DIFFERENTIAL AND PLATELET COUNT (01/02/2017 4:27 PM)  CBC WITH DIFFERENTIAL AND PLATELET COUNT (01/02/2017 4:27 PM)   Component Value Ref Range   WBC COUNT 9.6 3.9 - 11.9 K/UL   RBC COUNT 5.16 4.08 - 5.79 M/UL   HEMOGLOBIN 15.2 13.1 - 17.1 GM/DL   HEMATOCRIT 45.0 38.7 - 51.4 %   MCV 87.2 82.9 - 100.6 FL   MCH 29.4 27.6 - 33.2 PG   MCHC 33.7 32.0 - 36.0 %   RDW 13.4 10.0 - 16.2 %   PLATELETS 242 179 - 450 K/UL   MPV 9.1 7.4 - 10.4 FL   % NEUTRO 68.9 43 - 80 %   % LYMPHS 22.3 16.0 - 49.0 %   % MONOCYTE 8.0 0.0 - 10.0 %   % EOS 0.4 0 - 7 %   % BASO 0.4 0 - 2 %   ABS NEUTROPHIL (ANC) 6.6 1.6 - 8.1 K/UL   ABS LYMPH 2.1 0.9 - 3.5 K/UL   ABS MONOCYTE 0.8 0.0 - 1.1 K/UL   ABS EOS 0.0 0.0 - 0.8 K/UL   ABS BASO 0.0 0.0 - 0.2 K/UL     CBC WITH DIFFERENTIAL AND PLATELET COUNT (01/02/2017 4:27 PM)   Specimen Performing Laboratory     [facility]    [address] [place] [address]     Back to top of Lab Results      CHEMISTRY 8 TEST PANEL (01/02/2017 4:27 PM)  CHEMISTRY 8 TEST PANEL (01/02/2017 4:27 PM)   Component Value Ref Range   GLUCOSE 255 (H) 70 - 100 MG/DL   BLOOD UREA NITROGEN 16.4 10.0 - 20.0 MG/DL   CREATININE 1.08 0.72 - 1.25 MG/DL   GFR, EST (OTHER RACES) >60     GFR,EST() >60  Comment:   REFERENCE RANGE:  NORMAL  >60 ml/min/1.73m`2  GFR NOT CALCULATED IF <18 YEARS OLD         BUN/CREAT RATIO 15.2 11.7 - 22.9   CO2 31.0 (H) 22.0 - 29.0 MMOL/L   CHLORIDE 102 98 - 107 MMOL/L   SODIUM 137 136 - 146 MMOL/L   POTASSIUM 4.9 3.5 - 5.1 MMOL/L   CALCIUM 9.6 8.6 - 10.5 MG/DL   ANION GAP 8.9 (L) 10.0 - 20.0       EKG: Will be faxed and scanned from [facility]      Outside records reviewed from:     ASSESSMENT and PLAN  Micah Lopez is a 66 year old male scheduled to undergo  radical cystoprostatectomy, penectomy  and urethrectomy (procedure not listed) he has high grade urethral cancer with Dr. Brannon on TBD date at [facility].     1. Cardiac- HTN- well managed, HLD, denies CAD. >4 METS without symptoms uses treadmill for 15 minutes and is able to walk 4 blocks without symptoms.  EKG being faxed  - instructed to hold Lisinopril the DOS  -continue Lipitor  - aspirin is currently on hold  2. RESP- sleep apnea/obesity BMI 38.05- instructed to bring his CPAP the DOS.  Patient at risk for post-operative hypoventilation.   3. ENDO DM Type 2- Hgb A1c-8.6 , instructed to hold all diabetic medication the DOS.  He states his pre-prandial sugars have been elevated 160s, his PCP stopped one of his oral medications- will f/u with PCP also Hgb A1c 8.6   4.  Denies previous complications from anesthesia. Large neck, <3 TM, full beard however airway appears feasible.  5. Type and Screen, UA/UC- Positive today notified patient and urology- Ginna Cosby RN who will follow and treat after culture.  6. ERAS- teaching today Gabapentin and Acetaminophen was not yet ordered d/t surgery not being scheduled.     . He has the following specific operative considerations:   - RCRI : 0.9%  risk of major adverse cardiac event.   - Anesthesia considerations:  Refer to PAC assessment in anesthesia records  - VTE risk:3%  - KENIA # of risks /8 = Has sleep apnea will bring his CPAP  - Risk of PONV score = 1 If > 2, anti-emetic intervention recommended.      Patient was discussed with Dr. Delarosa.    MARINA Gonzalez CNP  Preoperative Assessment Center  [facility]  Phone: [phone]  Fax: [phone]

## 2017-01-18 NOTE — Clinical Note
1/18/2017       RE: Micah Lopez  [address]  [place] [address]     Dear Colleague,    Thank you for referring your patient, Micah Lopez, to the PLASTIC AND RECONSTRUCTIVE SURGERY at [facility]. Please see a copy of my visit note below.    REFERRING PROVIDER:  Ricky Brannon MD, [facility]       PRESENTING COMPLAINT:  Consultation for pelvic reconstruction.      HISTORY OF PRESENTING COMPLAINT:  Mr. Lopez is 66 years old.  He has been diagnosed with urethral cancer and is scheduled to undergo a radical penectomy cystoprostatectomy and a urinary conduit,  I have been consulted for possible reconstruction of the perineum and pelvis.  He is tentatively scheduled for the beginning of February.  There are no plans for radiation therapy.  He may undergo chemotherapy, though he has not undergone it in the neoadjuvant setting.      PAST MEDICAL HISTORY:     1. Diabetes.   2. Hyperlipidemia.   3. Hypertension.   4. Depression.      PAST SURGICAL HISTORY:     1. Cystoscopies.    2. Suprapubic tube placement.   3. Vasectomy.        MEDICATIONS:     1. Statin drug.   2. Glipizide.    3. Metformin.   4. Lisinopril.   5.      6. Citalopram.   7. Aspirin.      ALLERGIES:  Nil.      SOCIAL HISTORY:  Does not smoke.  Used to smoke for about 3-4 years in 1970.  Does drink alcohol.  Lives in [place].  Is an .      REVIEW OF SYSTEMS:  Has some shortness of breath.  No chest pains, no MI, CVA, DVT or PE.      PHYSICAL EXAMINATION:  On exam vital signs stable.  He is afebrile in no obvious distress.  He is 6 feet, 280 pounds and a BMI 38 kg/m2.  On examination of his abdomen, he has a suprapubic catheter.  He has a protuberant abdomen.  Skin pinch thickness about 5-6 cm, mostly fat is on the inside of his abdomen.  He has no hernia.  He has no scars on the abdomen per se.  He has no scars on the anterior thighs.      ASSESSMENT AND PLAN:  Based on above findings, a  diagnosis of a urethral cancer requiring a radical penectomy, cystoprostatectomy and a urinary conduit with possible reconstruction of the perineum was made.  I had a long discussion with the patient about the potential need for my services, explained to him that if the need is for skin coverage and closure after the radical penectomy, I explained to him that as long as there is enough skin he will get primary closure but if not, then an ALT flap will be carried out.  If, however, he requires pelvic fill and pelvic reconstruction, then a vertical rectus musculocutaneous or just muscle flap may be required.  I explained to him the different flaps, explained to him where the scars would be and explained to them the expectations.  All risks, benefits and alternatives of the procedures including pain, infection, bleeding, scarring, asymmetry, seromas, hematomas, wound breakdown, wound dehiscence, loss of the flaps, requirement of further surgeries depending on pathology and outcome, chronic wound problems, seromas, hematomas, abscesses, injury to deeper structures like nerves, vessels muscles and bowel, DVT, PE, MI, CVA, pneumonia and death were all explained.  He understood everything and wants to proceed.  I look forward to helping him out in the near future.  I will discuss his case with Dr. Brannon as well.      Total time spent with patient was 40 minutes, more than half was counseling. \    M PAULA GANN MD       cc:   Ricky Brannon MD      D: 2017 16:16   T: 2017 18:17   MT: nh   Name:     SUSANNA WAKEFIELD   MRN:      [id number]        Account:      [id number]   :      1950           Service Date: 2017   Document: [id number]

## 2017-01-18 NOTE — Clinical Note
Dr Brannon,  I had the pleasure of seeing your patient in the PAC. Please find the attached H&P.  If you have any questions or concerns I can be reached in the PAC.  Best Regards,  DRAKE Tillman  [facility] Preoperative Assessment Center Phone: [phone] Fax: [phone]

## 2017-01-18 NOTE — ANESTHESIA PREPROCEDURE EVALUATION
Anesthesia Evaluation     . Pt has had prior anesthetic.       ROS/MED HX    ENT/Pulmonary:     (+)sleep apnea, uses CPAP , . .    Neurologic:  - neg neurologic ROS     Cardiovascular:     (+) Dyslipidemia, hypertension----. : . . . :. . Previous cardiac testing date:results:date: results:ECG reviewed date:12/19/16 results:NSR- date: results:          METS/Exercise Tolerance:  >4 METS   Hematologic:  - neg hematologic  ROS       Musculoskeletal:  - neg musculoskeletal ROS       GI/Hepatic:  - neg GI/hepatic ROS       Renal/Genitourinary: Comment: Urethral cancer        Endo:     (+) type II DM Not using insulin - not using insulin pump Normal glucose range: 160s pre-prandial Obesity, .      Psychiatric:     (+) psychiatric history depression      Infectious Disease:         Malignancy:   (+) Malignancy           Other:    (+) No chance of pregnancy C-spine cleared: N/A, no H/O Chronic Pain,no other significant disability              Physical Exam  Normal systems: cardiovascular and pulmonary    Airway   Mallampati: II  TM distance: <3 FB  Neck ROM: full    Dental   Comment: Caps/crowns    Cardiovascular   Rhythm and rate: regular and normal      Pulmonary    breath sounds clear to auscultation               PAC Discussion and Assessment    ASA Classification: 3  Case is suitable for:   Anesthetic techniques and relevant risks discussed: GA  Invasive monitoring and risk discussed: Yes  Types:   Possibility and Risk of blood transfusion discussed: Yes  NPO instructions given: NPO after midnight  Additional anesthetic preparation and risks discussed: Invasive monitoring  Needs early admission to pre-op area:   Other:     PAC Resident/NP Anesthesia Assessment:  ASSESSMENT and PLAN  Micah Lopez is a 66 year old male scheduled to undergo  radical cystoprostatectomy, penectomy and urethrectomy (procedure not listed) he has high grade urethral cancer with Dr. Brannon on TBD date at [facility].     1. Cardiac- HTN- well managed, HLD, denies CAD. >4 METS without symptoms uses treadmill for 15 minutes and is able to walk 4 blocks without symptoms.  EKG being faxed  - instructed to hold Lisinopril the DOS  -continue Lipitor  - aspirin is currently on hold  2. RESP- sleep apnea/obesity BMI 38.05- instructed to bring his CPAP the DOS.  Patient at risk for post-operative hypoventilation.   3. ENDO DM Type 2- Hgb A1c-8.6 , instructed to hold all diabetic medication the DOS.  He states his pre-prandial sugars have been elevated 160s and Hgb A1c 8.6 today, his PCP stopped one of his oral medications- will f/u with PCP  4.  Denies previous complications from anesthesia. Large neck, <3 TM, full beard however airway appears feasible.  5. Type and Screen, UA/UC positive-.  Notified urology Ginna Cosby RN who will follow and treat after culture  6. ERAS- teaching today Gabapentin and Acetaminophen was not ordered    . He has the following specific operative considerations:   - RCRI : 0.9%  risk of major adverse cardiac event.   - VTE risk:3%  - KENIA # of risks /8 = Has sleep apnea will bring his CPAP  - Risk of PONV score = 1 If > 2, anti-emetic intervention recommended.      Patient was discussed with Dr. Delarosa.    MARINA Gonzalez CNP        Mid-Level Provider/Resident:   Date:   Time:     Attending Anesthesiologist Anesthesia Assessment:  Pt seen qs answered    Reviewed and Signed by PAC Anesthesiologist  Anesthesiologist: wilson  Date: 1.18.17  Time:   Pass/Fail:   Disposition:     PAC Pharmacist Assessment:        Pharmacist:   Date:   Time:                           .

## 2017-01-18 NOTE — PROGRESS NOTES
WOC Preoperative Ostomy    Referral from Dr. Brannon  Consult attended by patient  Diagnosis: urethral cancer Date of Surgery: Feb 2017   Type of Surgery: ileal conduit  Emotional readiness for surgery: distraught  Physical Limitations: Without limitations  Abdomen assessed for site by: Patient's ability to visiualize area, avoidance of skin creases and scars, palpating for rectus abdominus muscle and clothing fit  Teaching: Anatomy/picture of stoma, stoma/bowel function postop, intro to pouches, written/media offered and role of WOC/postop followup explained  Stoma site marking:  Marking pen and tegaderm   Location chosen: RLQ    Dr. Molina was available for supervision of care if needed or if questions should arise and regarding plan of care.  Susie Ramirez RN CWON

## 2017-01-18 NOTE — MR AVS SNAPSHOT
After Visit Summary   2017    Micah Lopez    MRN: [id number]           Patient Information     Date Of Birth          1950        Visit Information        Provider Department      2017 11:00 AM Rn, [facility]        Care Instructions    Preparing for Your Surgery      Name:  Micah Lopez   MRN:  [id number]   :  1950   Today's Date:  2017     Arriving for surgery:  Surgery date:  Will call with information  Surgery time:  Will call with information  Arrival time:  Will call with information  Please come to:     Will call with information    What can I eat or drink?  -  You may have solid food or milk products until 8 hours prior to your surgery.  -  You may have water, apple juice or 7up/Sprite until 2 hours prior to your surgery.    Which medicines can I take?  -  Do NOT take these medications in the morning, the day of surgery:  Aspirin, ibuprofen, lisinopril, metformin    -  Please take these medications the day of surgery:  Tylenol,celexa if normally taken in the morning      How do I prepare myself?  -  Take two showers: one the night before surgery; and one the morning of surgery.         Use Scrubcare or Hibiclens to wash from neck down.  You may use your own shampoo and conditioner. No other hair products.   -  Do NOT use lotion, powder, deodorant, or antiperspirant the day of your surgery.  -  Do NOT wear any makeup, fingernail polish or jewelry.  -  Begin using Incentive Spirometer 1 week prior to surgery.  Use 4 times per day, up to 5-10 breaths each time.  Bring Incentive Spirometer to hospital.  -Do not bring your own medications to the hospital, except for inhalers and eye drops.  -  Bring your ID and insurance card.    Questions or Concerns:  If you have questions or concerns, please call the  Preoperative Assessment Center, Monday-Friday 7AM-7PM:  [phone]          Enhanced Recovery After Surgery     This is a  team effort, including you, to get you back on your feet, eating and drinking normally and out of the hospital as quickly as possible.  The goals are: 1) NO INFECTIONS and   2) RETURN TO NORMAL DIET    How can we achieve these goals?  1) STAY ACTIVE: Walk every day before your surgery; try to increase the amount every day.  Walk after surgery as much as you can-the nurses will help you.  Walking speeds healing and gets you home quicker, you heal better at home and have less risk of infection.     2) INCENTIVE SPIROMETER: Practice your incentive spirometer 4 times per day with 5-10 repetitions each time.  Using the incentive spirometer can strengthen your muscles between your ribs and help you have a strong cough after surgery.  A more effective cough can help prevent problems with your lungs.    3) STAY HYDRATED: Drink clear liquids up until 2 hours before your surgery. We would like you to purchase a drink such as Gatorade.  Drink this before bedtime and on the way into the hospital, drink between 8-12 ounces or until you feel hydrated.  Keeping well hydrated leads to your veins being plump, you wake up faster, and you are less likely to be nauseated. Start drinking water as soon as you can after surgery and advance to clear liquids and food as tolerated.  IV fluids contain salt, drinking fluids will minimize the amount of IV fluids you need and decrease the amount of salt you get.     4) PAIN MANAGEMENT: If we minimize the amount of opioids and narcotics, and use regional blocks (which numb the area where your surgery is) along with oral pain medications; you will have less side effects of nausea and constipation. Narcotics can slow down your bowels and cause you to stay in the hospital longer.     Our goal is to keep you comfortable; eating and drinking normally and back home safely.         Follow-ups after your visit        Your next 10 appointments already scheduled     Jan 18, 2017 11:00 AM   (Arrive by 10:45  AM)   PAC RN ASSESSMENT with [place] Pac Rn   [facility] ([facility])    [address] [place] [id number] [phone]            Jan 18, 2017 11:30 AM   (Arrive by 11:15 AM)   PAC EVALUATION with  Pac Ke 4   [facility] [place] ([facility])    [address] [place] [id number] [phone]            Jan 18, 2017 12:50 PM   (Arrive by 12:35 PM)   PAC Anesthesia Consult with  Pac Anesthesiologist   [facility] [place] ([facility])    [address] [place] [id number] [phone]            Jan 18, 2017  1:00 PM   LAB with  LAB   [facility] ([facility])    [address] [place] [id number] [phone]           Patient must bring picture ID.  Patient should be prepared to give a urine specimen  Please do not eat 10-12 hours before your appointment if you are coming in fasting for labs on lipids, cholesterol, or glucose (sugar).  Pregnant women should follow their Care Team instructions. Water with medications is okay. Do not drink coffee or other fluids.   If you have concerns about taking  your medications, please ask at office or if scheduling via [employer], send a message by clicking on Secure Messaging, Message Your Care Team.            Feb 23, 2017 10:45 AM   (Arrive by 10:30 AM)   Post-Op with Ricky Brannon MD   [facility] ([facility])    [address] [place] [id number] [phone]              Who to contact     Please call your clinic at [phone] to:    Ask questions about your health    Make or cancel appointments    Discuss your medicines    Learn about your test results    Speak to your doctor   If you have  compliments or concerns about an experience at your clinic, or if you wish to file a complaint, please contact [facility] Patient Relations at [phone] or email us at [email or web address]         Additional Information About Your Visit        MyChart Information     [employer] gives you secure access to your electronic health record. If you see a primary care provider, you can also send messages to your care team and make appointments. If you have questions, please call your primary care clinic.  If you do not have a primary care provider, please call [phone] and they will assist you.      [employer] is an electronic gateway that provides easy, online access to your medical records. With [employer] you can request a clinic appointment, read your test results, renew a prescription or communicate with your care team.     To access your existing account, please contact your [facility] or call [phone] for assistance.        Care EveryWhere ID     This is your Care EveryWhere ID. This could be used by other organizations to access your [place] medical records  [id number]         Blood Pressure from Last 3 Encounters:   01/09/17 131/94    Weight from Last 3 Encounters:   01/09/17 127.007 kg (280 lb)              Today, you had the following     No orders found for display       Primary Care Provider Office Phone # Fax #    Vicente Jimenez [phone]       [facility] MEDICAL [address]  [facility] [address]        Thank you!     Thank you for choosing [facility]  for your care. Our goal is always to provide you with excellent care. Hearing back from our patients is one way we can continue to improve our services. Please take a few minutes to complete the written survey that you may receive in the mail after your visit with us. Thank you!             Your Updated Medication List - Protect  others around you: Learn how to safely use, store and throw away your medicines at www.disposemymeds.org.          This list is accurate as of: 1/18/17 10:26 AM.  Always use your most recent med list.                   Brand Name Dispense Instructions for use    aspirin 81 MG tablet      Take 81 mg by mouth daily       atorvastatin 80 MG tablet    LIPITOR     Take 80 mg by mouth every morning       celeXA 20 MG tablet   Generic drug:  citalopram      Take 20 mg by mouth At Bedtime       cyclobenzaprine 10 MG tablet    FLEXERIL     Take 10 mg by mouth At Bedtime       glipiZIDE-metFORMIN 2.5-500 MG per tablet    METAGLIP     Take 1 tablet by mouth 2 times daily (before meals)       lisinopril 10 MG tablet    PRINIVIL/ZESTRIL     Take 10 mg by mouth every morning       MULTIVITAMIN ADULT PO      Take 1 tablet by mouth every morning       TYLENOL 500 MG tablet   Generic drug:  acetaminophen      Take 500 mg by mouth as needed for mild pain (NO more than twice daily)

## 2017-01-18 NOTE — Clinical Note
Dr Trinidad,  I had the pleasure of seeing your patient in the PAC. Please find the attached H&P.  If you have any questions or concerns I can be reached in the PAC.  Best Regards,  DRAKE Tillman  [facility] Phone: [phone] Fax: [phone]

## 2017-01-19 PROCEDURE — 00000346 ZZHCL STATISTIC REVIEW OUTSIDE SLIDES TC 88321: Performed by: UROLOGY

## 2017-01-19 NOTE — PROGRESS NOTES
REFERRING PROVIDER:  Ricky Brannon MD, [facility]       PRESENTING COMPLAINT:  Consultation for pelvic reconstruction.      HISTORY OF PRESENTING COMPLAINT:  Mr. Lopez is 66 years old.  He has been diagnosed with urethral cancer and is scheduled to undergo a radical penectomy cystoprostatectomy and a urinary conduit,  I have been consulted for possible reconstruction of the perineum and pelvis.  He is tentatively scheduled for the beginning of February.  There are no plans for radiation therapy.  He may undergo chemotherapy, though he has not undergone it in the neoadjuvant setting.      PAST MEDICAL HISTORY:     1. Diabetes.   2. Hyperlipidemia.   3. Hypertension.   4. Depression.      PAST SURGICAL HISTORY:     1. Cystoscopies.    2. Suprapubic tube placement.   3. Vasectomy.        MEDICATIONS:     1. Statin drug.   2. Glipizide.    3. Metformin.   4. Lisinopril.   5.      6. Citalopram.   7. Aspirin.      ALLERGIES:  Nil.      SOCIAL HISTORY:  Does not smoke.  Used to smoke for about 3-4 years in 1970.  Does drink alcohol.  Lives in [place].  Is an .      REVIEW OF SYSTEMS:  Has some shortness of breath.  No chest pains, no MI, CVA, DVT or PE.      PHYSICAL EXAMINATION:  On exam vital signs stable.  He is afebrile in no obvious distress.  He is 6 feet, 280 pounds and a BMI 38 kg/m2.  On examination of his abdomen, he has a suprapubic catheter.  He has a protuberant abdomen.  Skin pinch thickness about 5-6 cm, mostly fat is on the inside of his abdomen.  He has no hernia.  He has no scars on the abdomen per se.  He has no scars on the anterior thighs.      ASSESSMENT AND PLAN:  Based on above findings, a diagnosis of a urethral cancer requiring a radical penectomy, cystoprostatectomy and a urinary conduit with possible reconstruction of the perineum was made.  I had a long discussion with the patient about the potential need for my services, explained to him that if the need is for skin  coverage and closure after the radical penectomy, I explained to him that as long as there is enough skin he will get primary closure but if not, then an ALT flap will be carried out.  If, however, he requires pelvic fill and pelvic reconstruction, then a vertical rectus musculocutaneous or just muscle flap may be required.  I explained to him the different flaps, explained to him where the scars would be and explained to them the expectations.  All risks, benefits and alternatives of the procedures including pain, infection, bleeding, scarring, asymmetry, seromas, hematomas, wound breakdown, wound dehiscence, loss of the flaps, requirement of further surgeries depending on pathology and outcome, chronic wound problems, seromas, hematomas, abscesses, injury to deeper structures like nerves, vessels muscles and bowel, DVT, PE, MI, CVA, pneumonia and death were all explained.  He understood everything and wants to proceed.  I look forward to helping him out in the near future.  I will discuss his case with Dr. Brannon as well.      Total time spent with patient was 40 minutes, more than half was counseling.      cc:   Ricky Brannon MD   [facility]         [facility] campusMYRNA GANN MD             D: 2017 16:16   T: 2017 18:17   MT: nh      Name:     SUSANNA WAKEFIELD   MRN:      [id number]        Account:      [id number]   :      1950           Service Date: 2017      Document: [id number]

## 2017-01-20 ENCOUNTER — CARE COORDINATION (OUTPATIENT)
Dept: UROLOGY | Facility: CLINIC | Age: 67
End: 2017-01-20

## 2017-01-20 DIAGNOSIS — N39.0 URINARY TRACT INFECTION: Primary | ICD-10-CM

## 2017-01-20 LAB
BACTERIA SPEC CULT: ABNORMAL
MICRO REPORT STATUS: ABNORMAL
MICROORGANISM SPEC CULT: ABNORMAL
SPECIMEN SOURCE: ABNORMAL

## 2017-01-20 RX ORDER — NITROFURANTOIN 25; 75 MG/1; MG/1
100 CAPSULE ORAL 2 TIMES DAILY
Qty: 14 CAPSULE | Refills: 0 | Status: SHIPPED | OUTPATIENT
Start: 2017-01-20

## 2017-01-20 NOTE — PROGRESS NOTES
Patient with positive UC.  Patient scheduled tentively for surgery on 2/6/17.  Patient to be treated with Macrobid for 7 days and then will have repeat UA/UC done the week prior to procedure.  Patient states understanding.  Orders will be sent to [facility] for UA/UC and script sent to his pharmacy.

## 2017-01-24 ENCOUNTER — HOSPITAL ENCOUNTER (INPATIENT)
Facility: CLINIC | Age: 67
Setting detail: SURGERY ADMIT
End: 2017-01-24
Attending: UROLOGY | Admitting: UROLOGY
Payer: COMMERCIAL

## 2017-01-24 LAB — COPATH REPORT: NORMAL

## 2017-01-25 ENCOUNTER — TELEPHONE (OUTPATIENT)
Dept: UROLOGY | Facility: CLINIC | Age: 67
End: 2017-01-25

## 2017-01-25 NOTE — TELEPHONE ENCOUNTER
----- Message from Ginna Cosby RN sent at 1/25/2017  9:59 AM CST -----  Regarding: RE: Pt FMLA forms  Contact: [phone]  Mailed out yesterday  ----- Message -----     From: Leisa Khan LPN     Sent: 1/25/2017   9:43 AM       To: Ginna Cosby RN  Subject: FW: Pt FMLA forms                                    ----- Message -----     From: Doris Payne     Sent: 1/24/2017   6:19 PM       To: Urology & [facility] Triage-  Subject: Pt FMLA forms                                    Pt Ph # [phone],    Pt of Dr Brannon,    Pt is looking for [facility] paperwork to give to employer,    Wondering if you have completed it,     Please call Pt back to discuss,    Thought Dr Brannon was filling them all out and mailing them back to Pt,    Doris Watkins in Call Center    Please DO NOT send this message and/or reply back to sender.  Call Center Representatives DO NOT respond to messages.

## 2017-01-26 ENCOUNTER — CARE COORDINATION (OUTPATIENT)
Dept: UROLOGY | Facility: CLINIC | Age: 67
End: 2017-01-26

## 2017-01-26 NOTE — PROGRESS NOTES
He is wanting to know if there is any point in radiation prior to the procedure? Please advise. Thank you     Per Dr. Brannon,   Not really. I discussed this with Dr. Yu and he said that if margins are positive there may be a role, but otherwise no strong evidence for radiation pre-operatively     Patient called and message left for patient. Instructed to call back with any questions or concerns.

## 2017-02-02 ENCOUNTER — CARE COORDINATION (OUTPATIENT)
Dept: UROLOGY | Facility: CLINIC | Age: 67
End: 2017-02-02

## 2017-02-02 NOTE — PROGRESS NOTES
Called and spoke with patient. He is going to pursue chemotherapy at [facility] in [place].  He will contact me in the future if he wants to reschedule surgery with Dr. Brannon. Dr. Brannon notified. Surgery will be canceled.

## 2017-02-03 ENCOUNTER — TELEPHONE (OUTPATIENT)
Dept: NEPHROLOGY | Facility: CLINIC | Age: 67
End: 2017-02-03

## 2017-02-03 NOTE — TELEPHONE ENCOUNTER
Micah Lopez  is a 66 year old male presents today for new nutrition consultation.    Vitals:  There were no vitals taken for this visit.      Nutrition History  Patient is on a regular  diet at home.  Recall:  B: Cold cereal milk or oatmeal with milk  L: Taco and [employer]  D: Meat with veggies    Physical Activity  Not assessed     Time with Patient:  20 Minutes    Nutrition  DX:.   No diagnosis found.      Nutrition Goals:   1: Follow-up with RD once home from [place] to address nutrition and cancer        Candi Funk RD  [facility]

## 2019-11-02 ENCOUNTER — HEALTH MAINTENANCE LETTER (OUTPATIENT)
Age: 69
End: 2019-11-02

## 2020-02-10 ENCOUNTER — HEALTH MAINTENANCE LETTER (OUTPATIENT)
Age: 70
End: 2020-02-10

## 2020-11-14 ENCOUNTER — HEALTH MAINTENANCE LETTER (OUTPATIENT)
Age: 70
End: 2020-11-14

## 2021-04-03 ENCOUNTER — HEALTH MAINTENANCE LETTER (OUTPATIENT)
Age: 71
End: 2021-04-03

## 2021-05-23 ENCOUNTER — HEALTH MAINTENANCE LETTER (OUTPATIENT)
Age: 71
End: 2021-05-23

## 2021-09-12 ENCOUNTER — HEALTH MAINTENANCE LETTER (OUTPATIENT)
Age: 71
End: 2021-09-12

## 2022-01-02 ENCOUNTER — HEALTH MAINTENANCE LETTER (OUTPATIENT)
Age: 72
End: 2022-01-02

## 2022-04-24 ENCOUNTER — HEALTH MAINTENANCE LETTER (OUTPATIENT)
Age: 72
End: 2022-04-24

## 2022-08-14 ENCOUNTER — HEALTH MAINTENANCE LETTER (OUTPATIENT)
Age: 72
End: 2022-08-14

## 2022-11-19 ENCOUNTER — HEALTH MAINTENANCE LETTER (OUTPATIENT)
Age: 72
End: 2022-11-19

## 2023-04-09 ENCOUNTER — HEALTH MAINTENANCE LETTER (OUTPATIENT)
Age: 73
End: 2023-04-09

## 2023-06-01 ENCOUNTER — HEALTH MAINTENANCE LETTER (OUTPATIENT)
Age: 73
End: 2023-06-01

## 2023-11-18 ENCOUNTER — HEALTH MAINTENANCE LETTER (OUTPATIENT)
Age: 73
End: 2023-11-18